# Patient Record
Sex: FEMALE | Race: WHITE | Employment: OTHER
[De-identification: names, ages, dates, MRNs, and addresses within clinical notes are randomized per-mention and may not be internally consistent; named-entity substitution may affect disease eponyms.]

---

## 2018-10-03 ENCOUNTER — HEALTH MAINTENANCE LETTER (OUTPATIENT)
Age: 63
End: 2018-10-03

## 2018-10-09 ENCOUNTER — OFFICE VISIT (OUTPATIENT)
Dept: CARDIOLOGY | Facility: CLINIC | Age: 63
End: 2018-10-09
Attending: GENETIC COUNSELOR, MS
Payer: COMMERCIAL

## 2018-10-09 DIAGNOSIS — Z84.81 FAMILY HISTORY OF GENE MUTATION: ICD-10-CM

## 2018-10-09 DIAGNOSIS — Z82.49 FAMILY HISTORY OF THORACIC AORTIC ANEURYSM: ICD-10-CM

## 2018-10-09 DIAGNOSIS — Z84.81 FAMILY HISTORY OF GENE MUTATION: Primary | ICD-10-CM

## 2018-10-09 LAB — MISCELLANEOUS TEST: NORMAL

## 2018-10-09 PROCEDURE — 96040 ZZH GENETIC COUNSELING, EACH 30 MINUTES: CPT | Mod: ZF | Performed by: GENETIC COUNSELOR, MS

## 2018-10-09 PROCEDURE — 36415 COLL VENOUS BLD VENIPUNCTURE: CPT | Performed by: GENETIC COUNSELOR, MS

## 2018-10-09 NOTE — MR AVS SNAPSHOT
After Visit Summary   10/9/2018    Magnolia Tracy    MRN: [id number]           Patient Information     Date Of Birth          1955        Visit Information        Provider Department      10/9/2018 9:00 AM Celina Damon GC [facility]        Today's Diagnoses     Family history of gene mutation    -  1    Family history of thoracic aortic aneurysm           Follow-ups after your visit        Who to contact     If you have questions or need follow up information about today's clinic visit or your schedule please contact [facility] directly at [phone].  Normal or non-critical lab and imaging results will be communicated to you by [employer]art, letter or phone within 4 business days after the clinic has received the results. If you do not hear from us within 7 days, please contact the clinic through [employer] or phone. If you have a critical or abnormal lab result, we will notify you by phone as soon as possible.  Submit refill requests through [employer] or call your pharmacy and they will forward the refill request to us. Please allow 3 business days for your refill to be completed.          Additional Information About Your Visit        MyChart Information     [employer] gives you secure access to your electronic health record. If you see a primary care provider, you can also send messages to your care team and make appointments. If you have questions, please call your primary care clinic.  If you do not have a primary care provider, please call [phone] and they will assist you.        Care EveryWhere ID     This is your Care EveryWhere ID. This could be used by other organizations to access your [place] medical records  [id number]         Blood Pressure from Last 3 Encounters:   No data found for BP    Weight from Last 3 Encounters:   No data found for Wt               Primary Care Provider    None Specified       No primary provider on file.        Equal Access to  Services     [facility]: Hadii gilberto Dougherty, waraulda umbertoadaha, qaybta kaionlilliam de jesus, dinora bowden. So [facility] [phone].    ATENCIÓN: Si habla español, tiene a tyler disposición servicios gratuitos de asistencia lingüística. Llame al [phone].    We comply with applicable federal civil rights laws and [place] laws. We do not discriminate on the basis of race, color, national origin, age, disability, sex, sexual orientation, or gender identity.            Thank you!     Thank you for choosing [facility]  for your care. Our goal is always to provide you with excellent care. Hearing back from our patients is one way we can continue to improve our services. Please take a few minutes to complete the written survey that you may receive in the mail after your visit with us. Thank you!             Your Updated Medication List - Protect others around you: Learn how to safely use, store and throw away your medicines at www.disposemymeds.org.      Notice  As of 10/9/2018  6:50 PM    You have not been prescribed any medications.

## 2018-10-09 NOTE — LETTER
10/9/2018      RE: Magnolia Tracy  [address] [address]       Dear Colleague,    Thank you for the opportunity to participate in the care of your patient, Magnolia Tracy, at the [facility]. Please see a copy of my visit note below.    Here is a copy of the progress note from your recent genetic counseling visit [facility] on Date: 10/9/2018.    PROGRESS NOTE: Magnolia was seen for genetic counseling due to the family history of aortic dissections and a known genetic mutation.  I had the opportunity to meet with Magnolia, her  [place]enton, and aunt Lorna today to discuss the genetic mutation in their family and testing options available to her.     MEDICAL HISTORY: Magnolia reports that she is in good health overall.  She states that she had normal imaging of her abdominal aorta in .    FAMILY HISTORY: A detailed family history was obtained at office visit on 10/9/2018.  (Please see scanned pedigree for details).  Family history was significant for the following: Magnolia report that her mother had an aortic aneurysm diagnosed at 52 years.  She had 7 surgeries but  from a dissection at 59 years.  Magnolia's maternal uncle  at 68 years from emphysema and colon cancer.  He also had a known aneurysm.  Another maternal uncle  in his 50's from colon cancer but had an aortic dissection at 43 years of age.  Another maternal uncle had a dissection at 45 years that was repaired.  His son also had an aortic dissection at 33 years of age and underwent genetic testing.  Testing at [facility] ([id number]) revealed a mutation in the MYH11 gene (c.2156 G>A).  His father was also found to have this mutation. Magnolia's maternal grandfather  suddenly at 47 years of age and no autopsy was performed.  He had a brother who also  suddenly in his 50's and he had two daughters - one  from a  dissection and the other has a known aneurysm with the MYH11 mutation. There is no additional history of aneurysms or dissections, heart attacks, fainting, sudden cardiac death, genetic conditions, or birth defects.     DISCUSSION:  Reviewed diagnosis of aortic dissection. Explained that aortic aneurysms and dissections can occur in families as part of the thoracic aortic aneurysm and dissection (TAAD) condition.  MYH11 gene mutations are found in about 1% of families with TAAD.  Individuals who carry the gene mutation are at increased risk for developing and aneurysm or dissection.  The average age of onset is for dissections is in the mid 40's.    Mutations in the MYH11 gene are inherited in an autosomal dominant (AD) pattern. Reviewed AD inheritance. Explained that most AD cardiac mutations are inherited from a parent, therefore it is appropriate to offer genetic testing in parents,siblings, and children.  Each of those family members has a 50% risk of carrying the same gene. Explained variable expressivity and reduced penetrance which can help explain why a condition can be genetic even when there is no apparent family history.     Discussed logistics of genetic testing for a known familial mutation.  Turn around time for results is about 2-4 weeks. If testing is performed within 90 days of proband testing, [facility] will not charge family members. Letter from Emily states that testing will be free if performed within 90 days.  Based on dates of testing for Magnolia's uncle, she is within the 90 days.  However, my previous experience with [facility] policy was that it had to be a first degree relative and it was 90 days from initial family member's testing.  Magnolia is not a first degree relative and it is not 90 days since the original testing in her cousin.  We will submit sample assuming testing is covered and lab will contact us if Magnolia needs to pay out of pocket.     Magnolia understands that if she is found to carry  a mutation she will be at increased risk for aneurysm and each of her children will have a 50% risk of carrying the mutation. If Magnolia does NOT carry the familial mutation, she is not at increased risk nor are her children.    All questions answered at this time.    PLAN:Magnolia elected to proceed with genetic testing.  Requisition and consent forms were completed and signed.  Blood was drawn and sample was sent to laboratory. I will contact patient when results are available.    TOTAL TIME SPENT IN COUNSELIN minutes    Celina Damon MS  Licensed Genetic Counselor  [facility]        Please do not hesitate to contact me if you have any questions/concerns.     Sincerely,     Celina Damon GC

## 2018-10-09 NOTE — LETTER
Date:October 18, 2018      Patient was self referred, no letter generated. Do not send.        [facility] Health Information

## 2018-10-11 NOTE — PROGRESS NOTES
Here is a copy of the progress note from your recent genetic counseling visit to [facility] on Date: 10/9/2018.    PROGRESS NOTE: Magnolia was seen for genetic counseling due to the family history of aortic dissections and a known genetic mutation.  I had the opportunity to meet with Magnolia, her  [place], and aunt Lorna today to discuss the genetic mutation in their family and testing options available to her.     MEDICAL HISTORY: Magnolia reports that she is in good health overall.  She states that she had normal imaging of her abdominal aorta in .    FAMILY HISTORY: A detailed family history was obtained at office visit on 10/9/2018.  (Please see scanned pedigree for details).  Family history was significant for the following: Magnolia report that her mother had an aortic aneurysm diagnosed at 52 years.  She had 7 surgeries but  from a dissection at 59 years.  Magnolia's maternal uncle  at 68 years from emphysema and colon cancer.  He also had a known aneurysm.  Another maternal uncle  in his 50's from colon cancer but had an aortic dissection at 43 years of age.  Another maternal uncle had a dissection at 45 years that was repaired.  His son also had an aortic dissection at 33 years of age and underwent genetic testing.  Testing at [facility] ([id number]) revealed a mutation in the MYH11 gene (c.2156 G>A).  His father was also found to have this mutation. Magnolia's maternal grandfather  suddenly at 47 years of age and no autopsy was performed.  He had a brother who also  suddenly in his 50's and he had two daughters - one  from a dissection and the other has a known aneurysm with the MYH11 mutation. There is no additional history of aneurysms or dissections, heart attacks, fainting, sudden cardiac death, genetic conditions, or birth defects.     DISCUSSION:  Reviewed diagnosis of aortic dissection. Explained that aortic aneurysms and  dissections can occur in families as part of the thoracic aortic aneurysm and dissection (TAAD) condition.  MYH11 gene mutations are found in about 1% of families with TAAD.  Individuals who carry the gene mutation are at increased risk for developing and aneurysm or dissection.  The average age of onset is for dissections is in the mid 40's.    Mutations in the MYH11 gene are inherited in an autosomal dominant (AD) pattern. Reviewed AD inheritance. Explained that most AD cardiac mutations are inherited from a parent, therefore it is appropriate to offer genetic testing in parents,siblings, and children.  Each of those family members has a 50% risk of carrying the same gene. Explained variable expressivity and reduced penetrance which can help explain why a condition can be genetic even when there is no apparent family history.     Discussed logistics of genetic testing for a known familial mutation.  Turn around time for results is about 2-4 weeks. If testing is performed within 90 days of proband testing, [facility] will not charge family members. Letter from [facility] states that testing will be free if performed within 90 days.  Based on dates of testing for Magnolia's uncle, she is within the 90 days.  However, my previous experience with [facility] policy was that it had to be a first degree relative and it was 90 days from initial family member's testing.  Magnolia is not a first degree relative and it is not 90 days since the original testing in her cousin.  We will submit sample assuming testing is covered and lab will contact us if Magnolia needs to pay out of pocket.     Magnolia understands that if she is found to carry a mutation she will be at increased risk for aneurysm and each of her children will have a 50% risk of carrying the mutation. If Magnolia does NOT carry the familial mutation, she is not at increased risk nor are her children.    All questions answered at this time.    PLAN:Magnolia elected to proceed with genetic  testing.  Requisition and consent forms were completed and signed.  Blood was drawn and sample was sent to laboratory. I will contact patient when results are available.    TOTAL TIME SPENT IN COUNSELIN minutes    Celina Damon MS  Licensed Genetic Counselor  [facility]

## 2018-10-17 ENCOUNTER — TELEPHONE (OUTPATIENT)
Dept: CARDIOLOGY | Facility: CLINIC | Age: 63
End: 2018-10-17

## 2018-10-17 NOTE — TELEPHONE ENCOUNTER
Spoke with Magnolia today to review results of genetic testing. She underwent genetic testing on 10/9/18 due to the family history of aneurysms/dissection and a known genetic mutation (MYH11) in several family members.  [facility] and revealed that Mganolia does NOT carry a mutation in the MYH11 gene. This result significantly reduces, but does not eliminate, her risk to develop aneurysms/dissection .  Based on these results, clinical screening is not recommended for Magnolia at this time. However, she should seek care if she has any symptoms associated with aneurysms/dissection or other heart problems.   Since Magnolia does NOT carry the MYH11 mutation, she cannot pass it on to her children.  A summary letter and copy of the results will be sent to patient.  All questions answered at this time.   Celina Damon MS  Licensed Genetic Counselor  [facility]

## 2018-11-16 LAB — LAB SCANNED RESULT: NORMAL

## 2020-03-11 ENCOUNTER — HEALTH MAINTENANCE LETTER (OUTPATIENT)
Age: 65
End: 2020-03-11

## 2021-01-03 ENCOUNTER — HEALTH MAINTENANCE LETTER (OUTPATIENT)
Age: 66
End: 2021-01-03

## 2021-01-15 ENCOUNTER — HEALTH MAINTENANCE LETTER (OUTPATIENT)
Age: 66
End: 2021-01-15

## 2021-10-10 ENCOUNTER — HEALTH MAINTENANCE LETTER (OUTPATIENT)
Age: 66
End: 2021-10-10

## 2022-01-29 ENCOUNTER — HEALTH MAINTENANCE LETTER (OUTPATIENT)
Age: 67
End: 2022-01-29

## 2022-03-26 ENCOUNTER — HEALTH MAINTENANCE LETTER (OUTPATIENT)
Age: 67
End: 2022-03-26

## 2022-09-18 ENCOUNTER — HEALTH MAINTENANCE LETTER (OUTPATIENT)
Age: 67
End: 2022-09-18

## 2023-05-07 ENCOUNTER — HEALTH MAINTENANCE LETTER (OUTPATIENT)
Age: 68
End: 2023-05-07